# Patient Record
Sex: FEMALE | Race: WHITE
[De-identification: names, ages, dates, MRNs, and addresses within clinical notes are randomized per-mention and may not be internally consistent; named-entity substitution may affect disease eponyms.]

---

## 2020-11-30 ENCOUNTER — HOSPITAL ENCOUNTER (OUTPATIENT)
Dept: HOSPITAL 47 - RADPROMAIN | Age: 40
Discharge: HOME | End: 2020-11-30
Attending: OTOLARYNGOLOGY
Payer: COMMERCIAL

## 2020-11-30 VITALS
HEART RATE: 108 BPM | SYSTOLIC BLOOD PRESSURE: 126 MMHG | DIASTOLIC BLOOD PRESSURE: 80 MMHG | RESPIRATION RATE: 16 BRPM | TEMPERATURE: 98.1 F

## 2020-11-30 DIAGNOSIS — R22.1: Primary | ICD-10-CM

## 2020-11-30 PROCEDURE — 76536 US EXAM OF HEAD AND NECK: CPT

## 2020-11-30 NOTE — US
ULTRASOUND GUIDED FNA NECK LYMPH NODE BIOPSY:

 

CLINICAL HISTORY: Outside ultrasound from [facility] and [facility] suggested 1.5 cm
 right and 1.2 cm left neck lymphadenopathy.

 

FINDINGS: 

Preliminary imaging demonstrated no sizable lymph node. There were short axis III mm lymph nodes bila
terally. Patient refused biopsy of these tiny nodules.

 

IMPRESSION: 

1. No sizable nodules seen for percutaneous biopsy. Repeat 3 month ultrasound recommended.